# Patient Record
Sex: FEMALE
[De-identification: names, ages, dates, MRNs, and addresses within clinical notes are randomized per-mention and may not be internally consistent; named-entity substitution may affect disease eponyms.]

---

## 2024-09-24 PROBLEM — Z00.00 ENCOUNTER FOR PREVENTIVE HEALTH EXAMINATION: Status: ACTIVE | Noted: 2024-09-24

## 2024-09-30 NOTE — HISTORY OF PRESENT ILLNESS
[de-identified] : Nargis Dawson is a 88 y/o female who presents for an initial consultation for a pancreas cyst.  She underwent a on CT 9/19/23 due to abdominal pain and failure to thrive. This showed an incidental 2.7 x 1.3 cm cystic lesion in the pancreatic head near the ampulla, as well as a dilated gallbladder, sigmoid colon wall thickening, and multiple hepatic cysts.   MRI on 9/21/23 was a limited study due to patient motion.   Interval MRCP on 9/20/24 showed innumerable hepatic cysts, pancreatic head cyst measuring 1.6 cm.

## 2024-10-03 ENCOUNTER — APPOINTMENT (OUTPATIENT)
Facility: CLINIC | Age: 89
End: 2024-10-03

## 2024-10-07 ENCOUNTER — APPOINTMENT (OUTPATIENT)
Facility: CLINIC | Age: 89
End: 2024-10-07

## 2024-10-07 VITALS
HEART RATE: 101 BPM | WEIGHT: 74 LBS | TEMPERATURE: 98.3 F | DIASTOLIC BLOOD PRESSURE: 70 MMHG | BODY MASS INDEX: 13.11 KG/M2 | HEIGHT: 63 IN | OXYGEN SATURATION: 94 % | SYSTOLIC BLOOD PRESSURE: 111 MMHG

## 2024-10-07 DIAGNOSIS — K86.89 OTHER SPECIFIED DISEASES OF PANCREAS: ICD-10-CM

## 2024-10-07 DIAGNOSIS — K76.89 OTHER SPECIFIED DISEASES OF LIVER: ICD-10-CM

## 2024-10-07 PROCEDURE — 99205 OFFICE O/P NEW HI 60 MIN: CPT
